# Patient Record
Sex: MALE | Race: WHITE | NOT HISPANIC OR LATINO | Employment: FULL TIME | URBAN - NONMETROPOLITAN AREA
[De-identification: names, ages, dates, MRNs, and addresses within clinical notes are randomized per-mention and may not be internally consistent; named-entity substitution may affect disease eponyms.]

---

## 2021-10-01 ENCOUNTER — TELEPHONE (OUTPATIENT)
Dept: GASTROENTEROLOGY | Facility: CLINIC | Age: 56
End: 2021-10-01

## 2021-10-01 ENCOUNTER — OFFICE VISIT (OUTPATIENT)
Dept: GASTROENTEROLOGY | Facility: CLINIC | Age: 56
End: 2021-10-01

## 2021-10-01 VITALS
DIASTOLIC BLOOD PRESSURE: 86 MMHG | SYSTOLIC BLOOD PRESSURE: 144 MMHG | BODY MASS INDEX: 30.16 KG/M2 | OXYGEN SATURATION: 99 % | TEMPERATURE: 96.8 F | HEART RATE: 61 BPM | HEIGHT: 74 IN | WEIGHT: 235 LBS

## 2021-10-01 DIAGNOSIS — K20.90 ESOPHAGITIS: ICD-10-CM

## 2021-10-01 DIAGNOSIS — Z80.0 FAMILY HX OF COLON CANCER: ICD-10-CM

## 2021-10-01 DIAGNOSIS — Z86.010 HX OF COLONIC POLYP: Primary | ICD-10-CM

## 2021-10-01 PROBLEM — Z86.0100 HX OF COLONIC POLYP: Status: ACTIVE | Noted: 2021-10-01

## 2021-10-01 PROCEDURE — 99204 OFFICE O/P NEW MOD 45 MIN: CPT | Performed by: NURSE PRACTITIONER

## 2021-10-01 NOTE — PROGRESS NOTES
"      [facility] Gastroenterology    Primary Physician Ritesh Whitney MD    10/1/2021    Jan Bee II   1965      Chief Complaint   Patient presents with   • Endoscopy   History colon polyps.   History esophagitis    Subjective     HPI    Jan Bee II is a 56 y.o. male who presents as a referral for preventative maintenance. He has no complaints of nausea or vomiting. No change in bowels. No wt loss. No BRBPR. No melena. No abdominal pain.       He does take pepcid daily for several months. No reflux symptoms. No weight loss. No dysphagia. No n/v. No hematemesis.        Last colonoscopy was 8/2020 in [place], had colon polyps. Recommended recall 5 years. The patient does not  have history of colon cancer.   There is not a family history of colon polyps. There is  family history of colon cancer mother at age 70.       Last egd was 8/2020 in [place]. He was told to have repeat egd 1 year.  Was told had \"scarring or esophagitis\"  in the esophagus. Had \" polyps\" noted. Also had hiatal hernia.     Past Medical History:   Diagnosis Date   • Ortiz esophagus    • Colon polyp    • Diaphragmatic hernia    • GERD (gastroesophageal reflux disease)        Past Surgical History:   Procedure Laterality Date   • CHOLECYSTECTOMY     • HERNIA REPAIR         Outpatient Medications Marked as Taking for the 10/1/21 encounter (Office Visit) with Danica An APRN   Medication Sig Dispense Refill   • famotidine (PEPCID) 40 MG tablet Take 40 mg by mouth Daily.         No Known Allergies    Social History     Socioeconomic History   • Marital status:    Tobacco Use   • Smoking status: Never Smoker   • Smokeless tobacco: Never Used   Substance and Sexual Activity   • Alcohol use: Yes     Comment: occ   • Drug use: Not Currently   • Sexual activity: Defer       Family History   Problem Relation Age of Onset   • Colon cancer Mother        Review of Systems   Constitutional: Negative for chills, fever and " unexpected weight change.   Respiratory: Negative for cough, shortness of breath and wheezing.    Cardiovascular: Negative for chest pain and palpitations.   Gastrointestinal: Negative for abdominal distention, abdominal pain, anal bleeding, blood in stool, constipation, diarrhea, nausea and vomiting.       Objective     Vitals:    10/01/21 0851   BP: 144/86   Pulse: 61   Temp: 96.8 °F (36 °C)   SpO2: 99%         10/01/21  0851   Weight: 107 kg (235 lb)     Body mass index is 30.17 kg/m².    Physical Exam  Vitals reviewed.   Constitutional:       General: He is not in acute distress.  Cardiovascular:      Rate and Rhythm: Normal rate and regular rhythm.      Heart sounds: Normal heart sounds.   Pulmonary:      Effort: Pulmonary effort is normal.      Breath sounds: Normal breath sounds.   Abdominal:      General: Bowel sounds are normal. There is no distension.      Palpations: Abdomen is soft.      Tenderness: There is no abdominal tenderness.   Skin:     General: Skin is warm and dry.   Neurological:      Mental Status: He is alert.         Imaging Results (Most Recent)     None          Assessment/Plan     Diagnoses and all orders for this visit:    1. Hx of colonic polyp (Primary)    2. Family hx of colon cancer    3. Esophagitis  -     Case Request; Standing  -     Case Request    Other orders  -     Follow Anesthesia Guidelines / Protocol; Future  -     Obtain Informed Consent; Future      Due for colonoscopy 8/2025, sooner if signs or symptoms to suggest otherwise.  Will request records of last colonoscopy with path report from Dr. Shyam Hardin ( [place] , #[phone]).         In regard to history of esophagitis, he is asymptomatic now.  After his last upper endoscopy August 2020 he was recommended to have repeat upper endoscopy in 1 year.  I question if he might have had Ortiz's esophagus noted on path however I do not have those records to confirm.  I will request records for review.  Recommend continue  Pepcid daily.        Addendum: Received records from [facility].   EGD July 2020 by Dr. Hardin, for GERD and hiatal hernia, with final diagnosis of GERD and hiatal hernia.  Esophageal biopsy with path noting Ortiz's esophagus, negative for dysplasia or malignancy.    Colonoscopy July 2020 by Dr. Hardin, for screening, colon polyp noted with path tubular adenomatous polyp.        Danica An, APRN

## 2021-10-01 NOTE — H&P (VIEW-ONLY)
"      [facility]astroenter[facility]    Primary Physician Ritesh Whitney MD    10/1/2021    Jan Bee II   1965      Chief Complaint   Patient presents with   • Endoscopy   History colon polyps.   History esophagitis    Subjective     HPI    Jan Bee II is a 56 y.o. male who presents as a referral for preventative maintenance. He has no complaints of nausea or vomiting. No change in bowels. No wt loss. No BRBPR. No melena. No abdominal pain.       He does take pepcid daily for several months. No reflux symptoms. No weight loss. No dysphagia. No n/v. No hematemesis.        Last colonoscopy was 8/2020 in [place], had colon polyps. Recommended recall 5 years. The patient does not  have history of colon cancer.   There is not a family history of colon polyps. There is  family history of colon cancer mother at age 70.       Last egd was 8/2020 in [place]. He was told to have repeat egd 1 year.  Was told had \"scarring or esophagitis\"  in the esophagus. Had \" polyps\" noted. Also had hiatal hernia.     Past Medical History:   Diagnosis Date   • Ortiz esophagus    • Colon polyp    • Diaphragmatic hernia    • GERD (gastroesophageal reflux disease)        Past Surgical History:   Procedure Laterality Date   • CHOLECYSTECTOMY     • HERNIA REPAIR         Outpatient Medications Marked as Taking for the 10/1/21 encounter (Office Visit) with Danica An APRN   Medication Sig Dispense Refill   • famotidine (PEPCID) 40 MG tablet Take 40 mg by mouth Daily.         No Known Allergies    Social History     Socioeconomic History   • Marital status:    Tobacco Use   • Smoking status: Never Smoker   • Smokeless tobacco: Never Used   Substance and Sexual Activity   • Alcohol use: Yes     Comment: occ   • Drug use: Not Currently   • Sexual activity: Defer       Family History   Problem Relation Age of Onset   • Colon cancer Mother        Review of Systems   Constitutional: Negative for chills, fever and " unexpected weight change.   Respiratory: Negative for cough, shortness of breath and wheezing.    Cardiovascular: Negative for chest pain and palpitations.   Gastrointestinal: Negative for abdominal distention, abdominal pain, anal bleeding, blood in stool, constipation, diarrhea, nausea and vomiting.       Objective     Vitals:    10/01/21 0851   BP: 144/86   Pulse: 61   Temp: 96.8 °F (36 °C)   SpO2: 99%         10/01/21  0851   Weight: 107 kg (235 lb)     Body mass index is 30.17 kg/m².    Physical Exam  Vitals reviewed.   Constitutional:       General: He is not in acute distress.  Cardiovascular:      Rate and Rhythm: Normal rate and regular rhythm.      Heart sounds: Normal heart sounds.   Pulmonary:      Effort: Pulmonary effort is normal.      Breath sounds: Normal breath sounds.   Abdominal:      General: Bowel sounds are normal. There is no distension.      Palpations: Abdomen is soft.      Tenderness: There is no abdominal tenderness.   Skin:     General: Skin is warm and dry.   Neurological:      Mental Status: He is alert.         Imaging Results (Most Recent)     None          Assessment/Plan     Diagnoses and all orders for this visit:    1. Hx of colonic polyp (Primary)    2. Family hx of colon cancer    3. Esophagitis  -     Case Request; Standing  -     Case Request    Other orders  -     Follow Anesthesia Guidelines / Protocol; Future  -     Obtain Informed Consent; Future      Due for colonoscopy 8/2025, sooner if signs or symptoms to suggest otherwise.  Will request records of last colonoscopy with path report from Dr. Shyam Hardin ( [place] , #[phone]).         In regard to history of esophagitis, he is asymptomatic now.  After his last upper endoscopy August 2020 he was recommended to have repeat upper endoscopy in 1 year.  I question if he might have had Ortiz's esophagus noted on path however I do not have those records to confirm.  I will request records for review.  Recommend continue  Pepcid daily.        Addendum: Received records from [facility] [place] [facility].   EGD July 2020 by Dr. Hardin, for GERD and hiatal hernia, with final diagnosis of GERD and hiatal hernia.  Esophageal biopsy with path noting Ortiz's esophagus, negative for dysplasia or malignancy.    Colonoscopy July 2020 by Dr. Hardin, for screening, colon polyp noted with path tubular adenomatous polyp.        Danica An, APRN

## 2021-10-14 DIAGNOSIS — Z01.818 PREOPERATIVE TESTING: Primary | ICD-10-CM

## 2021-10-18 ENCOUNTER — LAB (OUTPATIENT)
Dept: LAB | Facility: HOSPITAL | Age: 56
End: 2021-10-18

## 2021-10-18 LAB — SARS-COV-2 ORF1AB RESP QL NAA+PROBE: NOT DETECTED

## 2021-10-18 PROCEDURE — C9803 HOPD COVID-19 SPEC COLLECT: HCPCS | Performed by: NURSE PRACTITIONER

## 2021-10-18 PROCEDURE — U0004 COV-19 TEST NON-CDC HGH THRU: HCPCS | Performed by: NURSE PRACTITIONER

## 2021-10-20 ENCOUNTER — ANESTHESIA (OUTPATIENT)
Dept: GASTROENTEROLOGY | Facility: HOSPITAL | Age: 56
End: 2021-10-20

## 2021-10-20 ENCOUNTER — ANESTHESIA EVENT (OUTPATIENT)
Dept: GASTROENTEROLOGY | Facility: HOSPITAL | Age: 56
End: 2021-10-20

## 2021-10-20 ENCOUNTER — HOSPITAL ENCOUNTER (OUTPATIENT)
Facility: HOSPITAL | Age: 56
Setting detail: HOSPITAL OUTPATIENT SURGERY
Discharge: HOME | End: 2021-10-20
Attending: INTERNAL MEDICINE | Admitting: INTERNAL MEDICINE

## 2021-10-20 VITALS
BODY MASS INDEX: 30.42 KG/M2 | DIASTOLIC BLOOD PRESSURE: 82 MMHG | HEIGHT: 74 IN | WEIGHT: 237 LBS | HEART RATE: 56 BPM | SYSTOLIC BLOOD PRESSURE: 122 MMHG | RESPIRATION RATE: 19 BRPM | OXYGEN SATURATION: 98 % | TEMPERATURE: 97.6 F

## 2021-10-20 DIAGNOSIS — K20.90 ESOPHAGITIS: ICD-10-CM

## 2021-10-20 PROCEDURE — 43239 EGD BIOPSY SINGLE/MULTIPLE: CPT | Performed by: INTERNAL MEDICINE

## 2021-10-20 PROCEDURE — 88313 SPECIAL STAINS GROUP 2: CPT | Performed by: INTERNAL MEDICINE

## 2021-10-20 PROCEDURE — 88305 TISSUE EXAM BY PATHOLOGIST: CPT | Performed by: INTERNAL MEDICINE

## 2021-10-20 PROCEDURE — 25010000002 PROPOFOL 10 MG/ML EMULSION: Performed by: NURSE ANESTHETIST, CERTIFIED REGISTERED

## 2021-10-20 RX ORDER — ONDANSETRON 2 MG/ML
4 INJECTION INTRAMUSCULAR; INTRAVENOUS ONCE AS NEEDED
Status: DISCONTINUED | OUTPATIENT
Start: 2021-10-20 | End: 2021-10-20 | Stop reason: HOSPADM

## 2021-10-20 RX ORDER — LIDOCAINE HYDROCHLORIDE 20 MG/ML
INJECTION, SOLUTION EPIDURAL; INFILTRATION; INTRACAUDAL; PERINEURAL AS NEEDED
Status: DISCONTINUED | OUTPATIENT
Start: 2021-10-20 | End: 2021-10-20 | Stop reason: SURG

## 2021-10-20 RX ORDER — SODIUM CHLORIDE 0.9 % (FLUSH) 0.9 %
10 SYRINGE (ML) INJECTION AS NEEDED
Status: DISCONTINUED | OUTPATIENT
Start: 2021-10-20 | End: 2021-10-20 | Stop reason: HOSPADM

## 2021-10-20 RX ORDER — MIDAZOLAM HYDROCHLORIDE 1 MG/ML
1 INJECTION, SOLUTION INTRAMUSCULAR; INTRAVENOUS
Status: CANCELLED | OUTPATIENT
Start: 2021-10-20

## 2021-10-20 RX ORDER — SODIUM CHLORIDE 9 MG/ML
100 INJECTION, SOLUTION INTRAVENOUS CONTINUOUS
Status: CANCELLED | OUTPATIENT
Start: 2021-10-20

## 2021-10-20 RX ORDER — LIDOCAINE HYDROCHLORIDE 10 MG/ML
0.5 INJECTION, SOLUTION EPIDURAL; INFILTRATION; INTRACAUDAL; PERINEURAL ONCE AS NEEDED
Status: DISCONTINUED | OUTPATIENT
Start: 2021-10-20 | End: 2021-10-20 | Stop reason: HOSPADM

## 2021-10-20 RX ORDER — SODIUM CHLORIDE 0.9 % (FLUSH) 0.9 %
10 SYRINGE (ML) INJECTION EVERY 12 HOURS SCHEDULED
Status: CANCELLED | OUTPATIENT
Start: 2021-10-20

## 2021-10-20 RX ORDER — PROPOFOL 10 MG/ML
VIAL (ML) INTRAVENOUS AS NEEDED
Status: DISCONTINUED | OUTPATIENT
Start: 2021-10-20 | End: 2021-10-20 | Stop reason: SURG

## 2021-10-20 RX ORDER — SODIUM CHLORIDE 9 MG/ML
500 INJECTION, SOLUTION INTRAVENOUS CONTINUOUS PRN
Status: DISCONTINUED | OUTPATIENT
Start: 2021-10-20 | End: 2021-10-20 | Stop reason: HOSPADM

## 2021-10-20 RX ORDER — SODIUM CHLORIDE 0.9 % (FLUSH) 0.9 %
10 SYRINGE (ML) INJECTION AS NEEDED
Status: CANCELLED | OUTPATIENT
Start: 2021-10-20

## 2021-10-20 RX ADMIN — LIDOCAINE HYDROCHLORIDE 100 MG: 20 INJECTION, SOLUTION EPIDURAL; INFILTRATION; INTRACAUDAL; PERINEURAL at 11:32

## 2021-10-20 RX ADMIN — PROPOFOL 50 MG: 10 INJECTION, EMULSION INTRAVENOUS at 11:34

## 2021-10-20 RX ADMIN — PROPOFOL 30 MG: 10 INJECTION, EMULSION INTRAVENOUS at 11:38

## 2021-10-20 RX ADMIN — SODIUM CHLORIDE 500 ML: 9 INJECTION, SOLUTION INTRAVENOUS at 09:47

## 2021-10-20 RX ADMIN — PROPOFOL 50 MG: 10 INJECTION, EMULSION INTRAVENOUS at 11:40

## 2021-10-20 RX ADMIN — PROPOFOL 50 MG: 10 INJECTION, EMULSION INTRAVENOUS at 11:39

## 2021-10-20 RX ADMIN — PROPOFOL 100 MG: 10 INJECTION, EMULSION INTRAVENOUS at 11:32

## 2021-10-20 RX ADMIN — PROPOFOL 20 MG: 10 INJECTION, EMULSION INTRAVENOUS at 11:36

## 2021-10-20 NOTE — INTERVAL H&P NOTE
H&P reviewed. The patient was examined and there are no changes to the H&P.      I did have a long discussion with him about Ortiz's disease.  We talked about Ortiz's its risk for esophageal carcinoma.  We talked about how Ortiz's can progress up the ladder first with low-grade dysplasia then with dysplasia high-grade and possible esophageal carcinoma.  Expressed understanding.  We talked on surveillance.  He wishes to proceed with surveillance endoscopy.

## 2021-10-20 NOTE — ANESTHESIA PREPROCEDURE EVALUATION
Anesthesia Evaluation     Patient summary reviewed   no history of anesthetic complications:  NPO Solid Status: > 8 hours  NPO Liquid Status: > 8 hours           Airway   Mallampati: II  Dental      Pulmonary    (-) not a smoker  Cardiovascular   Exercise tolerance: excellent (>7 METS)    (-) pacemaker, past MI, CAD, cardiac stents, CABG      Neuro/Psych  (-) seizures  GI/Hepatic/Renal/Endo    (+)  GERD,    (-) liver disease, no renal disease, diabetes, no thyroid disorder    Musculoskeletal     Abdominal    Substance History      OB/GYN          Other                        Anesthesia Plan    ASA 2     MAC     intravenous induction     Anesthetic plan, all risks, benefits, and alternatives have been provided, discussed and informed consent has been obtained with: patient and spouse/significant other.

## 2021-10-20 NOTE — ANESTHESIA POSTPROCEDURE EVALUATION
"Patient: Jan Bee II    Procedure Summary     Date: 10/20/21 Room / Location: [facility] ENDOSCOPY 6 / [facility] END[facility]COPY    Anesthesia Start: 1128 Anesthesia Stop: 1145    Procedure: ESOPHAGOGASTRODUODENOSCOPY WITH ANESTHESIA (N/A ) Diagnosis:       Esophagitis      (Esophagitis [K20.90])    Surgeons: Satya Hendrix MD Provider: Paul Blandon CRNA    Anesthesia Type: MAC ASA Status: 2          Anesthesia Type: MAC    Vitals  No vitals data found for the desired time range.          Post Anesthesia Care and Evaluation    Patient location during evaluation: PHASE II  Patient participation: complete - patient participated  Level of consciousness: awake  Pain score: 0  Pain management: adequate  Airway patency: patent  Anesthetic complications: No anesthetic complications  PONV Status: none  Cardiovascular status: acceptable  Respiratory status: acceptable  Hydration status: acceptable    Comments: Blood pressure 136/85, pulse 58, temperature 97.6 °F (36.4 °C), temperature source Temporal, resp. rate 20, height 188 cm (74\"), weight 108 kg (237 lb), SpO2 99 %.        "

## 2021-10-22 LAB
CYTO UR: NORMAL
LAB AP CASE REPORT: NORMAL
LAB AP DIAGNOSIS COMMENT: NORMAL
PATH REPORT.FINAL DX SPEC: NORMAL
PATH REPORT.GROSS SPEC: NORMAL

## 2024-08-12 ENCOUNTER — TELEPHONE (OUTPATIENT)
Dept: GASTROENTEROLOGY | Facility: CLINIC | Age: 59
End: 2024-08-12
Payer: COMMERCIAL

## 2024-08-12 NOTE — TELEPHONE ENCOUNTER
PT rc'd a recall letter for an endo.   PT isn't having any issues, no blood thinners.  Does he need an OV?

## 2024-09-27 NOTE — PROGRESS NOTES
Subjective    Mr. Bee is 59 y.o. male    Chief Complaint: Elevated PSA    History of Present Illness  Patient here for elevated PSA.  PSA drawn January 2024 which was 5.5.  Repeat PSA September 2024 5.3. No family history of prostate cancer.  No history of prostate biopsy.  AUA 7/35 with frequency, intermittency, urgency, nocturia X 1.  Denies dysuria or hematuria.      PSA 5.3 (9/3/2024)  PSA 5.5 (1/26/2024)  The following portions of the patient's history were reviewed and updated as appropriate: allergies, current medications, past family history, past medical history, past social history, past surgical history and problem list.    Review of Systems      Current Outpatient Medications:     famotidine (PEPCID) 40 MG tablet, Take 1 tablet by mouth Daily., Disp: , Rfl:     Ozempic, 0.25 or 0.5 MG/DOSE, 2 MG/3ML solution pen-injector, INJECT 0.5 MG SUBCUTANEOUSLY EVERY WEEK FOR 30 DAYS., Disp: , Rfl:     rosuvastatin (CRESTOR) 10 MG tablet, Take 1 tablet by mouth Daily., Disp: , Rfl:     Past Medical History:   Diagnosis Date    Plascencia esophagus     Colon polyp     Diaphragmatic hernia     GERD (gastroesophageal reflux disease)        Past Surgical History:   Procedure Laterality Date    CHOLECYSTECTOMY      ENDOSCOPY N/A 10/20/2021    Procedure: ESOPHAGOGASTRODUODENOSCOPY WITH ANESTHESIA;  Surgeon: Satya Hendrix MD;  Location: [facility] ENDOSCOPY;  Service: Gastroenterology;  Laterality: N/A;  pre esophagitis; plascencia's  post plascencia's  Ritesh Whitney MD    HERNIA REPAIR         Social History     Socioeconomic History    Marital status:    Tobacco Use    Smoking status: Never     Passive exposure: Never    Smokeless tobacco: Never   Vaping Use    Vaping status: Never Used   Substance and Sexual Activity    Alcohol use: Yes     Comment: occ    Drug use: Not Currently    Sexual activity: Defer       Family History   Problem Relation Age of Onset    Colon cancer Mother        Objective    Temp 97.1 °F  "(36.2 °C)   Ht 188 cm (74\")   Wt 104 kg (230 lb 3.2 oz)   BMI 29.56 kg/m²     Physical Exam  Genitourinary:     Comments: 60 gm prostate smooth no nodules          Results for orders placed or performed in visit on 10/02/24   POC Urinalysis Dipstick, Multipro    Specimen: Urine   Result Value Ref Range    Color Yellow Yellow, Straw, Dark Yellow, Serena    Clarity, UA Clear Clear    Glucose, UA Negative Negative mg/dL    Bilirubin Negative Negative    Ketones, UA Negative Negative    Specific Gravity  1.020 1.005 - 1.030    Blood, UA Negative Negative    pH, Urine 5.5 5.0 - 8.0    Protein, POC Negative Negative mg/dL    Urobilinogen, UA 0.2 E.U./dL Normal, 0.2 E.U./dL    Nitrite, UA Negative Negative    Leukocytes Negative Negative     IPSS Questionnaire (AUA-7):  Incomplete emptying  Over the past month, how often have you had a sensation of not emptying your bladder completely after you finished urinating?: Not at all (10/02/24 0818)  Frequency  Over the past month, how often have you had to urinate again less than two hours after you finishied urinating ?: About half the time (10/02/24 0818)  Intermittency  Over the past month, how often have you found you stopped and started again several time when you urinated ?: Less than 1 time in 5 (10/02/24 0818)  Urgency  Over the last month, how often have you found it difficult  have you found it difficult to postpone urination ?: Less than half the time (10/02/24 0818)  Weak Stream  Over the past month, how often have you had a weak urinary stream ?: Not at all (10/02/24 0818)  Straining  Over the past month, how often have you had to push or strain to begin urination ?: Not at all (10/02/24 0818)  Nocturia  Over the past month, how many times did you most typically get up to urinate from the time you went to bed until the time you got up in the morning ?: 1 time (10/02/24 0818)  Quality of life due to urinary symptoms  If you were to spend the rest of your life with " your urinary condition the way it is now, how would feel about that?: Pleased (10/02/24 0818)    Scores  Total IPSS Score: 7 (10/02/24 0818)  Total Score = Symptomatic Level: Mildly symptomatic: 0-7 (10/02/24 0818)        Assessment and Plan    Diagnoses and all orders for this visit:    1. Elevated PSA (Primary)  -     POC Urinalysis Dipstick, Multipro    2. BPH with urinary obstruction        I discussed options including observation with repeat PSA versus ExoDx testing versus MRI of his prostate.  Patient would like to ponder his decision.  He will call once a decision has been made.  He does understand there could be undiagnosed prostate cancer present.  This does represent a new diagnosis of uncertain prognosis.

## 2024-10-02 ENCOUNTER — TELEPHONE (OUTPATIENT)
Dept: UROLOGY | Facility: CLINIC | Age: 59
End: 2024-10-02

## 2024-10-02 ENCOUNTER — OFFICE VISIT (OUTPATIENT)
Dept: UROLOGY | Facility: CLINIC | Age: 59
End: 2024-10-02
Payer: COMMERCIAL

## 2024-10-02 VITALS — TEMPERATURE: 97.1 F | BODY MASS INDEX: 29.54 KG/M2 | WEIGHT: 230.2 LBS | HEIGHT: 74 IN

## 2024-10-02 DIAGNOSIS — R97.20 ELEVATED PSA: Primary | ICD-10-CM

## 2024-10-02 DIAGNOSIS — N40.1 BPH WITH URINARY OBSTRUCTION: ICD-10-CM

## 2024-10-02 DIAGNOSIS — N13.8 BPH WITH URINARY OBSTRUCTION: ICD-10-CM

## 2024-10-02 LAB
BILIRUB BLD-MCNC: NEGATIVE MG/DL
CLARITY, POC: CLEAR
COLOR UR: YELLOW
GLUCOSE UR STRIP-MCNC: NEGATIVE MG/DL
KETONES UR QL: NEGATIVE
LEUKOCYTE EST, POC: NEGATIVE
NITRITE UR-MCNC: NEGATIVE MG/ML
PH UR: 5.5 [PH] (ref 5–8)
PROT UR STRIP-MCNC: NEGATIVE MG/DL
RBC # UR STRIP: NEGATIVE /UL
SP GR UR: 1.02 (ref 1–1.03)
UROBILINOGEN UR QL: NORMAL

## 2024-10-02 RX ORDER — SEMAGLUTIDE 0.68 MG/ML
INJECTION, SOLUTION SUBCUTANEOUS
COMMUNITY

## 2024-10-02 RX ORDER — ROSUVASTATIN CALCIUM 10 MG/1
1 TABLET, COATED ORAL DAILY
COMMUNITY
Start: 2024-09-18

## 2024-10-02 NOTE — TELEPHONE ENCOUNTER
Caller: Jan Bee II    Relationship: Self    Best call back number: [phone]    What orders are you requesting (i.e. lab or imaging): MRI    In what timeframe would the patient need to come in:     Where will you receive your lab/imaging services: MRI AT [facility]    Additional notes: RECEIVED A CALL FROM PT. HE STATED AFTER DISCUSSING TREATMENT OPTIONS WITH HIS SPOUSE HE HAS OPTED INTO MRI OPTION. OFFICE PLEASE CALL PT BACK WITH THESE REGARDS.THANK YOU.

## 2024-10-31 ENCOUNTER — HOSPITAL ENCOUNTER (OUTPATIENT)
Dept: MRI IMAGING | Facility: HOSPITAL | Age: 59
Discharge: HOME | End: 2024-10-31
Admitting: UROLOGY
Payer: COMMERCIAL

## 2024-10-31 ENCOUNTER — OFFICE VISIT (OUTPATIENT)
Dept: GASTROENTEROLOGY | Facility: CLINIC | Age: 59
End: 2024-10-31
Payer: COMMERCIAL

## 2024-10-31 VITALS
OXYGEN SATURATION: 98 % | HEART RATE: 63 BPM | SYSTOLIC BLOOD PRESSURE: 120 MMHG | DIASTOLIC BLOOD PRESSURE: 72 MMHG | HEIGHT: 74 IN | WEIGHT: 229 LBS | BODY MASS INDEX: 29.39 KG/M2 | TEMPERATURE: 97.5 F

## 2024-10-31 DIAGNOSIS — K22.70 BARRETT'S ESOPHAGUS WITHOUT DYSPLASIA: Primary | ICD-10-CM

## 2024-10-31 DIAGNOSIS — R97.20 ELEVATED PSA: ICD-10-CM

## 2024-10-31 LAB — CREAT BLDA-MCNC: 1.1 MG/DL (ref 0.6–1.3)

## 2024-10-31 PROCEDURE — 25510000001 GADOPICLENOL 0.5 MMOL/ML SOLUTION: Performed by: UROLOGY

## 2024-10-31 PROCEDURE — 82565 ASSAY OF CREATININE: CPT

## 2024-10-31 PROCEDURE — 99204 OFFICE O/P NEW MOD 45 MIN: CPT | Performed by: NURSE PRACTITIONER

## 2024-10-31 PROCEDURE — A9579 GAD-BASE MR CONTRAST NOS,1ML: HCPCS | Performed by: UROLOGY

## 2024-10-31 PROCEDURE — 72197 MRI PELVIS W/O & W/DYE: CPT

## 2024-10-31 RX ADMIN — GADOPICLENOL 10 ML: 485.1 INJECTION INTRAVENOUS at 14:43

## 2024-10-31 NOTE — PROGRESS NOTES
[facility] GASTROENTEROLOGY - OFFICE NOTE    10/31/2024    Jan Bee II   1965    Primary Physician: Ritesh Whitney MD    Chief Complaint   Patient presents with    Endoscopy   Plascencia's esophagus       HISTORY OF PRESENT ILLNESS:     Jan Bee II is a 59 y.o. male presents witth plascencia's esophagus. Taking pepcid once daily with good control. No reflux symptoms. No dysphagia.       Taking ozempic since 3/2024.         UPPER GI ENDOSCOPY (10/20/2021 11:29) recall 3 years.   Tissue Pathology Exam (10/20/2021 11:35) no atypia.       SCANNED - COLONOSCOPY (07/23/2020) by Dr. Hardin. Tubular adenomatous colon polyp. Recall 5 years.   Mother had colon cancer.       Past Medical History:   Diagnosis Date    Plascencia esophagus     Colon polyp     Diaphragmatic hernia     GERD (gastroesophageal reflux disease)        Past Surgical History:   Procedure Laterality Date    CHOLECYSTECTOMY      ENDOSCOPY N/A 10/20/2021    Procedure: ESOPHAGOGASTRODUODENOSCOPY WITH ANESTHESIA;  Surgeon: Satya Hendrix MD;  Location: [facility] ENDOSCOPY;  Service: Gastroenterology;  Laterality: N/A;  pre esophagitis; plascencia's  post plascencia's  Ritesh Whitney MD    HERNIA REPAIR         Outpatient Medications Marked as Taking for the 10/31/24 encounter (Office Visit) with Danica An APRN   Medication Sig Dispense Refill    famotidine (PEPCID) 40 MG tablet Take 1 tablet by mouth Daily.      Ozempic, 0.25 or 0.5 MG/DOSE, 2 MG/3ML solution pen-injector INJECT 0.5 MG SUBCUTANEOUSLY EVERY WEEK FOR 30 DAYS.      rosuvastatin (CRESTOR) 10 MG tablet Take 1 tablet by mouth Daily.         No Known Allergies    Social History     Socioeconomic History    Marital status:    Tobacco Use    Smoking status: Never     Passive exposure: Never    Smokeless tobacco: Never   Vaping Use    Vaping status: Never Used   Substance and Sexual Activity    Alcohol use: Yes     Comment: occ    Drug use: Not Currently    Sexual  "activity: Defer       Family History   Problem Relation Age of Onset    Colon cancer Mother        Review of Systems   Constitutional:  Negative for chills, fever and unexpected weight change.   Respiratory:  Negative for shortness of breath.    Cardiovascular:  Negative for chest pain.   Gastrointestinal:  Negative for abdominal distention, abdominal pain, anal bleeding, blood in stool, constipation, diarrhea, nausea and vomiting.        Vitals:    10/31/24 0812   BP: 120/72   Pulse: 63   Temp: 97.5 °F (36.4 °C)   SpO2: 98%   Weight: 104 kg (229 lb)   Height: 188 cm (74\")      Body mass index is 29.4 kg/m².    Physical Exam  Vitals reviewed.   Constitutional:       General: He is not in acute distress.  Cardiovascular:      Rate and Rhythm: Normal rate and regular rhythm.      Heart sounds: Normal heart sounds.   Pulmonary:      Effort: Pulmonary effort is normal.      Breath sounds: Normal breath sounds.   Abdominal:      General: Bowel sounds are normal. There is no distension.      Palpations: Abdomen is soft.      Tenderness: There is no abdominal tenderness.   Skin:     General: Skin is warm and dry.   Neurological:      Mental Status: He is alert.                 ASSESSMENT AND PLAN    Assessment & Plan     Diagnoses and all orders for this visit:    1. Ortiz's esophagus without dysplasia (Primary)  -     Case Request; Standing  -     Case Request    Other orders  -     Implement Anesthesia Orders Day of Procedure; Standing       The importance of Ortiz's disease is that there is a small risk of those individuals developing cancer of the esophagus later in life.  The risk is typically small, with the majority only having a 1/2-1% lifetime risk.  Therefore, I recommend that you continue treating your acid reflux with lifestyle modifications. This includes gradually losing weight to achieve ideal body wt., elevation of the head of bed by 4-6 inches, nothing to eat or drink 3 hours prior to lying down, " avoiding tight clothing, stress reduction, tobacco cessation, reduction of alcohol intake, and dietary restrictions (avoiding caffeine, coffee, fatty foods, mints, chocolate, spicy foods and tomato based sauces as much as possible).  You should also take your acid reflux medicine as directed. Continue pepcid daily.             ESOPHAGOGASTRODUODENOSCOPY WITH ANESTHESIA (N/A)  Risk, benefits, and alternatives of endoscopy were explained in full.  They understand that there is a risk of bleeding, perforation, and infection.  The risk of perforation goes up with esophageal dilation.  Other options to evaluate UGI complaints could involve barium swallow or UGI series, but these would be diagnostic tests only.  Patient was given time to ask questions.  I answered them to their satisfaction and they are agreeable to proceeding         No follow-ups on file.        There are no Patient Instructions on file for this visit.      Danica An, APRN

## 2024-11-05 NOTE — PROGRESS NOTES
Subjective    Mr. Bee is 59 y.o. male    Chief Complaint: Elevated PSA    History of Present Illness  Patient here for elevated PSA.  PSA drawn January 2024 which was 5.5.  Repeat PSA September 2024 5.3. No family history of prostate cancer.  No history of prostate biopsy.  AUA 7/35 with frequency, intermittency, urgency, nocturia X 1.  Denies dysuria or hematuria.  MRI 10/31/24 with 77.48cc prostate with no lesions to biopsy.      PSA 5.3 (9/3/2024)  PSA 5.5 (1/26/2024)  The following portions of the patient's history were reviewed and updated as appropriate: allergies, current medications, past family history, past medical history, past social history, past surgical history and problem list.    Review of Systems      Current Outpatient Medications:     famotidine (PEPCID) 40 MG tablet, Take 1 tablet by mouth Daily., Disp: , Rfl:     Ozempic, 0.25 or 0.5 MG/DOSE, 2 MG/3ML solution pen-injector, INJECT 0.5 MG SUBCUTANEOUSLY EVERY WEEK FOR 30 DAYS., Disp: , Rfl:     rosuvastatin (CRESTOR) 10 MG tablet, Take 1 tablet by mouth Daily., Disp: , Rfl:     Past Medical History:   Diagnosis Date    Plascencia esophagus     Colon polyp     Diaphragmatic hernia     GERD (gastroesophageal reflux disease)        Past Surgical History:   Procedure Laterality Date    CHOLECYSTECTOMY      ENDOSCOPY N/A 10/20/2021    Procedure: ESOPHAGOGASTRODUODENOSCOPY WITH ANESTHESIA;  Surgeon: Satya Hendrix MD;  Location: [facility] ENDOSCOPY;  Service: Gastroenterology;  Laterality: N/A;  pre esophagitis; plascencia's  post plascencia's  Ritesh Whitney MD    HERNIA REPAIR         Social History     Socioeconomic History    Marital status:    Tobacco Use    Smoking status: Never     Passive exposure: Never    Smokeless tobacco: Never   Vaping Use    Vaping status: Never Used   Substance and Sexual Activity    Alcohol use: Yes     Comment: occ    Drug use: Not Currently    Sexual activity: Defer       Family History   Problem Relation Age of  Onset    Colon cancer Mother        Objective    There were no vitals taken for this visit.    Physical Exam        Results for orders placed or performed during the hospital encounter of 10/31/24   POC Creatinine    Collection Time: 10/31/24  2:10 PM    Specimen: Blood   Result Value Ref Range    Creatinine 1.10 0.60 - 1.30 mg/dL     Assessment and Plan    Diagnoses and all orders for this visit:    1. Elevated PSA (Primary)    2. BPH with urinary obstruction         MRI of prostate negative for any lesiosn with 78.4cc prostate.    I personally reviewed these images today.  I have recommended observation only.  We discussed that MRIs 9% accurate in this setting.  He does not want any adjunctive testing with Oncotype or ExoDx.    He would like to have his PSA checked by his PCP yearly.  He will follow-up if he has further issues.

## 2024-11-13 ENCOUNTER — OFFICE VISIT (OUTPATIENT)
Dept: UROLOGY | Facility: CLINIC | Age: 59
End: 2024-11-13
Payer: COMMERCIAL

## 2024-11-13 DIAGNOSIS — R97.20 ELEVATED PSA: Primary | ICD-10-CM

## 2024-11-13 DIAGNOSIS — N40.1 BPH WITH URINARY OBSTRUCTION: ICD-10-CM

## 2024-11-13 DIAGNOSIS — N13.8 BPH WITH URINARY OBSTRUCTION: ICD-10-CM

## 2024-11-13 PROCEDURE — 99213 OFFICE O/P EST LOW 20 MIN: CPT | Performed by: UROLOGY

## 2024-12-06 PROBLEM — K22.70 BARRETT'S ESOPHAGUS WITHOUT DYSPLASIA: Status: ACTIVE | Noted: 2024-10-31

## 2024-12-11 ENCOUNTER — ANESTHESIA EVENT (OUTPATIENT)
Dept: GASTROENTEROLOGY | Facility: HOSPITAL | Age: 59
End: 2024-12-11
Payer: COMMERCIAL

## 2024-12-11 NOTE — ANESTHESIA PREPROCEDURE EVALUATION
Anesthesia Evaluation     Patient summary reviewed   no history of anesthetic complications:   NPO Solid Status: > 8 hours  NPO Liquid Status: > 8 hours           Airway   Mallampati: I  TM distance: >3 FB  No difficulty expected  Dental      Pulmonary    (-) not a smoker  Cardiovascular   Exercise tolerance: excellent (>7 METS)    (+) hyperlipidemia  (-) pacemaker, past MI, CAD, cardiac stents      Neuro/Psych  (+) Parkinson's disease  (-) seizures, TIA, CVA  GI/Hepatic/Renal/Endo    (+) GERD, diabetes mellitus  (-) liver disease, no renal disease, no thyroid disorder    Musculoskeletal     Abdominal    Substance History      OB/GYN          Other                      Anesthesia Plan    ASA 2     MAC       Anesthetic plan, risks, benefits, and alternatives have been provided, discussed and informed consent has been obtained with: patient and spouse/significant other.

## 2024-12-12 ENCOUNTER — ANESTHESIA (OUTPATIENT)
Dept: GASTROENTEROLOGY | Facility: HOSPITAL | Age: 59
End: 2024-12-12
Payer: COMMERCIAL

## 2024-12-12 ENCOUNTER — HOSPITAL ENCOUNTER (OUTPATIENT)
Facility: HOSPITAL | Age: 59
Setting detail: HOSPITAL OUTPATIENT SURGERY
Discharge: HOME | End: 2024-12-12
Attending: INTERNAL MEDICINE | Admitting: INTERNAL MEDICINE
Payer: COMMERCIAL

## 2024-12-12 VITALS
TEMPERATURE: 96.3 F | BODY MASS INDEX: 28.23 KG/M2 | RESPIRATION RATE: 14 BRPM | HEART RATE: 71 BPM | HEIGHT: 74 IN | DIASTOLIC BLOOD PRESSURE: 76 MMHG | SYSTOLIC BLOOD PRESSURE: 124 MMHG | OXYGEN SATURATION: 93 % | WEIGHT: 220 LBS

## 2024-12-12 DIAGNOSIS — K22.70 BARRETT'S ESOPHAGUS WITHOUT DYSPLASIA: ICD-10-CM

## 2024-12-12 PROCEDURE — 25010000002 PROPOFOL 10 MG/ML EMULSION: Performed by: NURSE ANESTHETIST, CERTIFIED REGISTERED

## 2024-12-12 PROCEDURE — 88305 TISSUE EXAM BY PATHOLOGIST: CPT | Performed by: INTERNAL MEDICINE

## 2024-12-12 PROCEDURE — 87081 CULTURE SCREEN ONLY: CPT | Performed by: INTERNAL MEDICINE

## 2024-12-12 PROCEDURE — 25010000002 LIDOCAINE PF 2% 2 % SOLUTION: Performed by: NURSE ANESTHETIST, CERTIFIED REGISTERED

## 2024-12-12 PROCEDURE — 43239 EGD BIOPSY SINGLE/MULTIPLE: CPT | Performed by: INTERNAL MEDICINE

## 2024-12-12 RX ORDER — ONDANSETRON 2 MG/ML
4 INJECTION INTRAMUSCULAR; INTRAVENOUS ONCE AS NEEDED
Status: DISCONTINUED | OUTPATIENT
Start: 2024-12-12 | End: 2024-12-12 | Stop reason: HOSPADM

## 2024-12-12 RX ORDER — SODIUM CHLORIDE 0.9 % (FLUSH) 0.9 %
10 SYRINGE (ML) INJECTION AS NEEDED
Status: DISCONTINUED | OUTPATIENT
Start: 2024-12-12 | End: 2024-12-12 | Stop reason: HOSPADM

## 2024-12-12 RX ORDER — PANTOPRAZOLE SODIUM 40 MG/1
40 TABLET, DELAYED RELEASE ORAL DAILY
Qty: 30 TABLET | Refills: 11 | Status: SHIPPED | OUTPATIENT
Start: 2024-12-12

## 2024-12-12 RX ORDER — LIDOCAINE HYDROCHLORIDE 20 MG/ML
INJECTION, SOLUTION EPIDURAL; INFILTRATION; INTRACAUDAL; PERINEURAL AS NEEDED
Status: DISCONTINUED | OUTPATIENT
Start: 2024-12-12 | End: 2024-12-12 | Stop reason: SURG

## 2024-12-12 RX ORDER — LIDOCAINE HYDROCHLORIDE 10 MG/ML
0.5 INJECTION, SOLUTION EPIDURAL; INFILTRATION; INTRACAUDAL; PERINEURAL ONCE AS NEEDED
Status: DISCONTINUED | OUTPATIENT
Start: 2024-12-12 | End: 2024-12-12 | Stop reason: HOSPADM

## 2024-12-12 RX ORDER — PROPOFOL 10 MG/ML
VIAL (ML) INTRAVENOUS AS NEEDED
Status: DISCONTINUED | OUTPATIENT
Start: 2024-12-12 | End: 2024-12-12 | Stop reason: SURG

## 2024-12-12 RX ORDER — SODIUM CHLORIDE 9 MG/ML
500 INJECTION, SOLUTION INTRAVENOUS CONTINUOUS PRN
Status: DISCONTINUED | OUTPATIENT
Start: 2024-12-12 | End: 2024-12-12 | Stop reason: HOSPADM

## 2024-12-12 RX ADMIN — PROPOFOL 50 MG: 10 INJECTION, EMULSION INTRAVENOUS at 09:25

## 2024-12-12 RX ADMIN — Medication 10 ML: at 07:38

## 2024-12-12 RX ADMIN — PROPOFOL 50 MG: 10 INJECTION, EMULSION INTRAVENOUS at 09:24

## 2024-12-12 RX ADMIN — PROPOFOL 50 MG: 10 INJECTION, EMULSION INTRAVENOUS at 09:22

## 2024-12-12 RX ADMIN — PROPOFOL 50 MG: 10 INJECTION, EMULSION INTRAVENOUS at 09:28

## 2024-12-12 RX ADMIN — PROPOFOL 50 MG: 10 INJECTION, EMULSION INTRAVENOUS at 09:17

## 2024-12-12 RX ADMIN — PROPOFOL 50 MG: 10 INJECTION, EMULSION INTRAVENOUS at 09:21

## 2024-12-12 RX ADMIN — LIDOCAINE HYDROCHLORIDE 100 MG: 20 INJECTION, SOLUTION EPIDURAL; INFILTRATION; INTRACAUDAL; PERINEURAL at 09:14

## 2024-12-12 RX ADMIN — PROPOFOL 50 MG: 10 INJECTION, EMULSION INTRAVENOUS at 09:26

## 2024-12-12 RX ADMIN — PROPOFOL 100 MG: 10 INJECTION, EMULSION INTRAVENOUS at 09:14

## 2024-12-12 RX ADMIN — PROPOFOL 50 MG: 10 INJECTION, EMULSION INTRAVENOUS at 09:19

## 2024-12-12 NOTE — H&P
[facility] Gastroenterology  Pre Procedure History & Physical    Chief Complaint:   Plascencia's    Subjective     HPI:   Plascencia's he denies any heartburn symptoms.  Not having regurgitation.  Takes Pepcid once a day and states that controls any symptoms he may have.  We talked about the pluses and minuses of PPI therapy versus H2 blockers.  I expressed that if he has any reflux symptoms whatsoever then he should bump up to the PPI.  He denies any.  Will see what his endoscopy exam shows today and go from there.    Past Medical History:   Past Medical History:   Diagnosis Date    Plascencia esophagus     Colon polyp     Diabetes mellitus     Diaphragmatic hernia     GERD (gastroesophageal reflux disease)        Past Surgical History:  Past Surgical History:   Procedure Laterality Date    CHOLECYSTECTOMY      ENDOSCOPY N/A 10/20/2021    Procedure: ESOPHAGOGASTRODUODENOSCOPY WITH ANESTHESIA;  Surgeon: Satya Hnedrix MD;  Location: [facility] ENDOSCOPY;  Service: Gastroenterology;  Laterality: N/A;  pre esophagitis; plascencia's  post plascencia's  Ritesh Whitney MD    HERNIA REPAIR         Family History:  Family History   Problem Relation Age of Onset    Colon cancer Mother        Social History:   reports that he has never smoked. He has never been exposed to tobacco smoke. He has never used smokeless tobacco. He reports current alcohol use. He reports that he does not currently use drugs.    Medications:   Prior to Admission medications    Medication Sig Start Date End Date Taking? Authorizing Provider   famotidine (PEPCID) 40 MG tablet Take 1 tablet by mouth Daily.   Yes Danica Blanton MD   rosuvastatin (CRESTOR) 10 MG tablet Take 1 tablet by mouth Daily. 9/18/24  Yes Danica Blanton MD   Ozempic, 0.25 or 0.5 MG/DOSE, 2 MG/3ML solution pen-injector INJECT 0.5 MG SUBCUTANEOUSLY EVERY WEEK FOR 30 DAYS.    ProviderDanica MD       Allergies:  Patient has no known allergies.    ROS:    General: Weight  "stable  Resp: No SOA  Cardiovascular: No CP    Objective     Blood pressure 130/75, pulse 65, temperature 96.3 °F (35.7 °C), temperature source Temporal, resp. rate 18, height 188 cm (74\"), weight 99.8 kg (220 lb), SpO2 99%.    Physical Exam   Constitutional: Pt is oriented to person, place, and in no distress.   Cardiovascular: Normal rate, regular rhythm.    Pulmonary/Chest: Effort normal. No respiratory distress.  Abdominal: Non-distended.  Psychiatric: Mood, memory, affect and judgment appear normal.     Assessment & Plan     Diagnosis:  Ortiz's    Anticipated Surgical Procedure:  Endoscopy    The risks, benefits, and alternatives of this procedure have been discussed with the patient or the responsible party- the patient understands and agrees to proceed.      EMR Dragon/transcription disclaimer:  Much of this encounter note is electronic transcription/translation of spoken language to printed text.  The electronic translation of spoken language may be erroneous, or at times, nonsensical words or phrases may be inadvertently transcribed.  Although I have reviewed the note for such errors, some may still exist.  "

## 2024-12-12 NOTE — ANESTHESIA POSTPROCEDURE EVALUATION
Patient: Jan Bee II    Procedure Summary       Date: 12/12/24 Room / Location: [facility] ENDOSCOPY 2 /  [facility]    Anesthesia Start: 0912 Anesthesia Stop: 0933    Procedure: ESOPHAGOGASTRODUODENOSCOPY WITH ANESTHESIA Diagnosis:       Ortiz's esophagus without dysplasia      (Ortiz's esophagus without dysplasia [K22.70])    Surgeons: Satya Hendrix MD Provider: Rowan Oropeza CRNA    Anesthesia Type: MAC ASA Status: 2            Anesthesia Type: MAC    Vitals  Vitals Value Taken Time   /78 12/12/24 0951   Temp     Pulse 63 12/12/24 0955   Resp 14 12/12/24 0950   SpO2 97 % 12/12/24 0955   Vitals shown include unfiled device data.        Post Anesthesia Care and Evaluation    Patient location during evaluation: PHASE II  Patient participation: complete - patient participated  Level of consciousness: awake and alert  Pain score: 0  Pain management: adequate    Airway patency: patent  Anesthetic complications: No anesthetic complications  PONV Status: none  Cardiovascular status: acceptable  Respiratory status: acceptable  Hydration status: acceptable  No anesthesia care post op

## 2024-12-13 LAB
CYTO UR: NORMAL
LAB AP CASE REPORT: NORMAL
Lab: NORMAL
PATH REPORT.FINAL DX SPEC: NORMAL
PATH REPORT.GROSS SPEC: NORMAL
UREASE TISS QL: NEGATIVE

## 2025-07-29 ENCOUNTER — OFFICE VISIT (OUTPATIENT)
Dept: GASTROENTEROLOGY | Facility: CLINIC | Age: 60
End: 2025-07-29
Payer: COMMERCIAL

## 2025-07-29 VITALS
DIASTOLIC BLOOD PRESSURE: 80 MMHG | SYSTOLIC BLOOD PRESSURE: 132 MMHG | HEIGHT: 73 IN | OXYGEN SATURATION: 99 % | BODY MASS INDEX: 30.75 KG/M2 | HEART RATE: 63 BPM | TEMPERATURE: 97.1 F | WEIGHT: 232 LBS

## 2025-07-29 DIAGNOSIS — Z86.0101 HISTORY OF ADENOMATOUS POLYP OF COLON: Primary | ICD-10-CM

## 2025-07-29 DIAGNOSIS — Z80.0 FAMILY HX OF COLON CANCER: ICD-10-CM

## 2025-07-29 PROCEDURE — S0260 H&P FOR SURGERY: HCPCS | Performed by: NURSE PRACTITIONER

## 2025-07-29 NOTE — PROGRESS NOTES
[facility] Gastroenterology    Primary Physician Ritesh Whitney MD    7/29/2025    Jan Bee II   1965      Chief Complaint   Patient presents with    Colonoscopy       Subjective     HPI    Jan Bee II is a 59 y.o. male who presents as a referral for preventative maintenance. He has no complaints of nausea or vomiting. No change in bowels. No wt loss. No BRBPR. No melena. No abdominal pain.           Colonoscopy 7/2020 had colon polyps , tubular adenomatous.  Mother had colon cancer in her 70's.     Past Medical History:   Diagnosis Date    Plascencia esophagus     Colon polyp     Diabetes mellitus     Diaphragmatic hernia     GERD (gastroesophageal reflux disease)        Past Surgical History:   Procedure Laterality Date    CHOLECYSTECTOMY      ENDOSCOPY N/A 10/20/2021    Procedure: ESOPHAGOGASTRODUODENOSCOPY WITH ANESTHESIA;  Surgeon: Satya Hendrix MD;  Location: [facility] ENDOSCOPY;  Service: Gastroenterology;  Laterality: N/A;  pre esophagitis; plascencia's  post plascencia's  Ritesh Whitney MD    ENDOSCOPY N/A 12/12/2024    Procedure: ESOPHAGOGASTRODUODENOSCOPY WITH ANESTHESIA;  Surgeon: Satya Hendrix MD;  Location: [facility] ENDOSCOPY;  Service: Gastroenterology;  Laterality: N/A;  pre op: Plascencia's esophagus without dysplasia  post op: plascencia's  pcp: Ritesh Whitney MD    HERNIA REPAIR         Outpatient Medications Marked as Taking for the 7/29/25 encounter (Office Visit) with Danica An APRN   Medication Sig Dispense Refill    pantoprazole (PROTONIX) 40 MG EC tablet Take 1 tablet by mouth Daily. 30 tablet 11    rosuvastatin (CRESTOR) 10 MG tablet Take 1 tablet by mouth Daily.      Semaglutide (OZEMPIC, 1 MG/DOSE, SC) Inject  under the skin into the appropriate area as directed 1 (One) Time Per Week.         No Known Allergies    Social History     Socioeconomic History    Marital status:    Tobacco Use    Smoking status: Never     Passive exposure:  Never    Smokeless tobacco: Never   Vaping Use    Vaping status: Never Used   Substance and Sexual Activity    Alcohol use: Yes     Comment: occ    Drug use: Not Currently    Sexual activity: Defer       Family History   Problem Relation Age of Onset    Colon cancer Mother        Review of Systems   Constitutional:  Negative for chills, fever and unexpected weight change.   Respiratory:  Negative for shortness of breath.    Cardiovascular:  Negative for chest pain.   Gastrointestinal:  Negative for abdominal distention, abdominal pain, anal bleeding, blood in stool, constipation, diarrhea, nausea and vomiting.       Objective     Vitals:    07/29/25 1403   BP: 132/80   Pulse: 63   Temp: 97.1 °F (36.2 °C)   SpO2: 99%         07/29/25  1403   Weight: 105 kg (232 lb)     Body mass index is 30.61 kg/m².    Physical Exam  Vitals reviewed.   Constitutional:       General: He is not in acute distress.  Cardiovascular:      Rate and Rhythm: Normal rate and regular rhythm.      Heart sounds: Normal heart sounds.   Pulmonary:      Effort: Pulmonary effort is normal.      Breath sounds: Normal breath sounds.   Abdominal:      General: Bowel sounds are normal. There is no distension.      Palpations: Abdomen is soft.      Tenderness: There is no abdominal tenderness.   Skin:     General: Skin is warm and dry.   Neurological:      Mental Status: He is alert.         Imaging Results (Most Recent)       None            Assessment & Plan     Diagnoses and all orders for this visit:    1. History of adenomatous polyp of colon (Primary)  -     Case Request; Standing  -     Case Request    2. Family hx of colon cancer    Other orders  -     Implement Anesthesia Orders Day of Procedure; Standing  -     Follow Anesthesia Guidelines / Protocol; Future  -     Obtain Informed Consent; Standing      Schedule colonoscopy. Miralax prep.        Hold ozempic 1 week prior to procedure.        COLONOSCOPY WITH ANESTHESIA (N/A)  All risks,  benefits, alternatives, and indications of colonoscopy procedure have been discussed with the patient. Risks to include perforation of the colon requiring possible surgery or colostomy, risk of bleeding from biopsies or removal of colon tissue, possibility of missing a colon polyp or cancer, or adverse drug reaction.  Benefits to include the diagnosis and management of disease of the colon and rectum. Alternatives to include barium enema, radiographic evaluation, lab testing or no intervention. Pt verbalizes understanding and agrees.     There are no Patient Instructions on file for this visit.    Danica An, APRN

## 2025-08-25 ENCOUNTER — ANESTHESIA (OUTPATIENT)
Dept: GASTROENTEROLOGY | Facility: HOSPITAL | Age: 60
End: 2025-08-25
Payer: COMMERCIAL

## 2025-08-25 ENCOUNTER — ANESTHESIA EVENT (OUTPATIENT)
Dept: GASTROENTEROLOGY | Facility: HOSPITAL | Age: 60
End: 2025-08-25
Payer: COMMERCIAL

## 2025-08-25 ENCOUNTER — HOSPITAL ENCOUNTER (OUTPATIENT)
Facility: HOSPITAL | Age: 60
Setting detail: HOSPITAL OUTPATIENT SURGERY
Discharge: HOME | End: 2025-08-25
Attending: INTERNAL MEDICINE | Admitting: INTERNAL MEDICINE
Payer: COMMERCIAL

## 2025-08-25 VITALS
HEIGHT: 74 IN | RESPIRATION RATE: 17 BRPM | SYSTOLIC BLOOD PRESSURE: 123 MMHG | HEART RATE: 59 BPM | DIASTOLIC BLOOD PRESSURE: 80 MMHG | WEIGHT: 225 LBS | OXYGEN SATURATION: 100 % | BODY MASS INDEX: 28.88 KG/M2 | TEMPERATURE: 96.9 F

## 2025-08-25 DIAGNOSIS — Z86.0101 HISTORY OF ADENOMATOUS POLYP OF COLON: ICD-10-CM

## 2025-08-25 LAB — GLUCOSE BLDC GLUCOMTR-MCNC: 128 MG/DL (ref 70–130)

## 2025-08-25 PROCEDURE — 88305 TISSUE EXAM BY PATHOLOGIST: CPT | Performed by: INTERNAL MEDICINE

## 2025-08-25 PROCEDURE — 25010000002 LIDOCAINE PF 2% 2 % SOLUTION: Performed by: NURSE ANESTHETIST, CERTIFIED REGISTERED

## 2025-08-25 PROCEDURE — 25810000003 SODIUM CHLORIDE 0.9 % SOLUTION: Performed by: NURSE ANESTHETIST, CERTIFIED REGISTERED

## 2025-08-25 PROCEDURE — 45385 COLONOSCOPY W/LESION REMOVAL: CPT | Performed by: INTERNAL MEDICINE

## 2025-08-25 PROCEDURE — 25010000002 PROPOFOL 10 MG/ML EMULSION: Performed by: NURSE ANESTHETIST, CERTIFIED REGISTERED

## 2025-08-25 PROCEDURE — 82948 REAGENT STRIP/BLOOD GLUCOSE: CPT | Performed by: NURSE ANESTHETIST, CERTIFIED REGISTERED

## 2025-08-25 RX ORDER — SODIUM CHLORIDE 0.9 % (FLUSH) 0.9 %
10 SYRINGE (ML) INJECTION AS NEEDED
Status: DISCONTINUED | OUTPATIENT
Start: 2025-08-25 | End: 2025-08-25 | Stop reason: HOSPADM

## 2025-08-25 RX ORDER — LIDOCAINE HYDROCHLORIDE 20 MG/ML
INJECTION, SOLUTION EPIDURAL; INFILTRATION; INTRACAUDAL; PERINEURAL AS NEEDED
Status: DISCONTINUED | OUTPATIENT
Start: 2025-08-25 | End: 2025-08-25 | Stop reason: SURG

## 2025-08-25 RX ORDER — ONDANSETRON 2 MG/ML
4 INJECTION INTRAMUSCULAR; INTRAVENOUS ONCE AS NEEDED
Status: DISCONTINUED | OUTPATIENT
Start: 2025-08-25 | End: 2025-08-25 | Stop reason: HOSPADM

## 2025-08-25 RX ORDER — LIDOCAINE HYDROCHLORIDE 10 MG/ML
0.5 INJECTION, SOLUTION EPIDURAL; INFILTRATION; INTRACAUDAL; PERINEURAL ONCE AS NEEDED
Status: DISCONTINUED | OUTPATIENT
Start: 2025-08-25 | End: 2025-08-25 | Stop reason: HOSPADM

## 2025-08-25 RX ORDER — PROPOFOL 10 MG/ML
VIAL (ML) INTRAVENOUS AS NEEDED
Status: DISCONTINUED | OUTPATIENT
Start: 2025-08-25 | End: 2025-08-25 | Stop reason: SURG

## 2025-08-25 RX ORDER — SODIUM CHLORIDE 9 MG/ML
500 INJECTION, SOLUTION INTRAVENOUS CONTINUOUS PRN
Status: DISCONTINUED | OUTPATIENT
Start: 2025-08-25 | End: 2025-08-25 | Stop reason: HOSPADM

## 2025-08-25 RX ORDER — CETIRIZINE HYDROCHLORIDE 5 MG/1
5 TABLET ORAL DAILY
COMMUNITY

## 2025-08-25 RX ADMIN — SODIUM CHLORIDE 500 ML: 9 INJECTION, SOLUTION INTRAVENOUS at 07:38

## 2025-08-25 RX ADMIN — PROPOFOL 50 MG: 10 INJECTION, EMULSION INTRAVENOUS at 08:56

## 2025-08-25 RX ADMIN — PROPOFOL 100 MG: 10 INJECTION, EMULSION INTRAVENOUS at 08:43

## 2025-08-25 RX ADMIN — PROPOFOL 50 MG: 10 INJECTION, EMULSION INTRAVENOUS at 08:49

## 2025-08-25 RX ADMIN — LIDOCAINE HYDROCHLORIDE 50 MG: 20 INJECTION, SOLUTION EPIDURAL; INFILTRATION; INTRACAUDAL; PERINEURAL at 08:43

## 2025-08-25 RX ADMIN — PROPOFOL 50 MG: 10 INJECTION, EMULSION INTRAVENOUS at 08:52

## 2025-08-25 RX ADMIN — PROPOFOL 50 MG: 10 INJECTION, EMULSION INTRAVENOUS at 09:04

## 2025-08-26 LAB
CYTO UR: NORMAL
LAB AP CASE REPORT: NORMAL
Lab: NORMAL
PATH REPORT.FINAL DX SPEC: NORMAL
PATH REPORT.GROSS SPEC: NORMAL

## (undated) DEVICE — Device: Brand: DEFENDO AIR/WATER/SUCTION AND BIOPSY VALVE

## (undated) DEVICE — THE CHANNEL CLEANING BRUSH IS A NYLON FLEXI BRUSH ATTACHED TO A FLEXIBLE PLASTIC SHEATH DESIGNED TO SAFELY REMOVE DEBRIS FROM FLEXIBLE ENDOSCOPES.

## (undated) DEVICE — THE SINGLE USE ETRAP – POLYP TRAP IS USED FOR SUCTION RETRIEVAL OF ENDOSCOPICALLY REMOVED POLYPS.: Brand: ETRAP

## (undated) DEVICE — CONMED SCOPE SAVER BITE BLOCK, 20X27 MM: Brand: SCOPE SAVER

## (undated) DEVICE — SENSR O2 OXIMAX FNGR A/ 18IN NONSTR

## (undated) DEVICE — YANKAUER,BULB TIP WITH VENT: Brand: ARGYLE

## (undated) DEVICE — ARGYLE YANKAUER BULB TIP WITH VENT: Brand: ARGYLE

## (undated) DEVICE — TBG SMPL FLTR LINE NASL 02/C02 A/ BX/100

## (undated) DEVICE — FRCP BX RADJAW4 NDL 2.8 240 STD OG

## (undated) DEVICE — MASK,OXYGEN,MED CONC,ADLT,7' TUB, UC: Brand: PENDING

## (undated) DEVICE — FRCP BIOP ENDO CAPTURAPRO SPK SERR 2.8MM 230CM

## (undated) DEVICE — Device: Brand: SINGLE USE ELECTROSURGICAL SNARE SD-400

## (undated) DEVICE — CUFF,BP,DISP,1 TUBE,ADULT,HP: Brand: MEDLINE

## (undated) DEVICE — DEFENDO AIR WATER SUCTION AND BIOPSY VALVE KIT: Brand: DEFENDO AIR/WATER/SUCTION AND BIOPSY VALVE